# Patient Record
Sex: FEMALE | Race: OTHER | HISPANIC OR LATINO | URBAN - METROPOLITAN AREA
[De-identification: names, ages, dates, MRNs, and addresses within clinical notes are randomized per-mention and may not be internally consistent; named-entity substitution may affect disease eponyms.]

---

## 2021-12-09 ENCOUNTER — EMERGENCY (EMERGENCY)
Facility: HOSPITAL | Age: 21
LOS: 1 days | Discharge: LEFT BEFORE DISCHARGE | End: 2021-12-09
Payer: SELF-PAY

## 2021-12-09 VITALS
SYSTOLIC BLOOD PRESSURE: 126 MMHG | DIASTOLIC BLOOD PRESSURE: 75 MMHG | TEMPERATURE: 98 F | HEART RATE: 91 BPM | RESPIRATION RATE: 18 BRPM | OXYGEN SATURATION: 98 %

## 2021-12-09 PROCEDURE — T1013: CPT

## 2021-12-09 PROCEDURE — L9991: CPT

## 2021-12-09 NOTE — ED ADULT TRIAGE NOTE - CHIEF COMPLAINT QUOTE
Presents to ED stating that she 12 weeks pregnant and having abdominal pain that began today. Denies N/V/D. This is her first pregnancy. Denies vaginal bleeding. No other complaints at this time.

## 2021-12-09 NOTE — ED ADULT TRIAGE NOTE - PATIENT/CAREGIVER ACCEPTED INTERPRETER SERVICES
yes
Anal Abscess     Section  X 2  Cosmetic Surgery  liposuction abdomen 11/24/10  H/O breast surgery  Reconstructive surgery - Right & Left  S/P carpal tunnel release  Bilateral  S/P Cholecystectomy    S/P excision of vocal cord nodule  2015  S/P Gastric Bypass   Dr. Gibson  S/P Lumpectomy of Breast  left   S/P mastectomy, bilateral

## 2022-01-18 PROBLEM — Z00.00 ENCOUNTER FOR PREVENTIVE HEALTH EXAMINATION: Status: ACTIVE | Noted: 2022-01-18

## 2022-01-19 ENCOUNTER — APPOINTMENT (OUTPATIENT)
Dept: MATERNAL FETAL MEDICINE | Facility: CLINIC | Age: 22
End: 2022-01-19

## 2022-01-27 ENCOUNTER — APPOINTMENT (OUTPATIENT)
Dept: ANTEPARTUM | Facility: CLINIC | Age: 22
End: 2022-01-27

## 2022-01-27 ENCOUNTER — APPOINTMENT (OUTPATIENT)
Dept: MATERNAL FETAL MEDICINE | Facility: CLINIC | Age: 22
End: 2022-01-27

## 2022-02-07 ENCOUNTER — APPOINTMENT (OUTPATIENT)
Dept: MATERNAL FETAL MEDICINE | Facility: CLINIC | Age: 22
End: 2022-02-07
Payer: MEDICAID

## 2022-02-07 ENCOUNTER — APPOINTMENT (OUTPATIENT)
Dept: ANTEPARTUM | Facility: CLINIC | Age: 22
End: 2022-02-07
Payer: MEDICAID

## 2022-02-07 ENCOUNTER — ASOB RESULT (OUTPATIENT)
Age: 22
End: 2022-02-07

## 2022-02-07 VITALS
BODY MASS INDEX: 31.13 KG/M2 | HEART RATE: 93 BPM | HEIGHT: 65.5 IN | OXYGEN SATURATION: 98 % | RESPIRATION RATE: 16 BRPM | WEIGHT: 189.13 LBS

## 2022-02-07 DIAGNOSIS — Z83.3 FAMILY HISTORY OF DIABETES MELLITUS: ICD-10-CM

## 2022-02-07 PROCEDURE — 99204 OFFICE O/P NEW MOD 45 MIN: CPT

## 2022-02-07 PROCEDURE — 76811 OB US DETAILED SNGL FETUS: CPT

## 2022-02-07 PROCEDURE — 36415 COLL VENOUS BLD VENIPUNCTURE: CPT

## 2022-02-07 PROCEDURE — ZZZZZ: CPT

## 2022-02-07 RX ORDER — VITAMIN C, CALCIUM, IRON, VITAMIN D3, VITAMIN E, VITAMIN B1, VITAMIN B2, VITAMIN B3, VITAMIN B6, FOLIC ACID, IODINE, ZINC, COPPER, DOCUSATE SODIUM, DOCOSAHEXAENOIC ACID (DHA) 27-1-50 MG
KIT ORAL
Refills: 0 | Status: ACTIVE | COMMUNITY

## 2022-02-07 NOTE — VITALS
[LMP (date): ___] : LMP was on [unfilled] [GA =___ Weeks] : which calculates to a GA of [unfilled] weeks [UZMA by LMP (date): ___] : The calculated UZMA by LMP is [unfilled] [US Date: ___] : ultrasound performed on [unfilled]. [GA= ___ Weeks] : Results were GA of [unfilled] weeks [GA= ___ Days] : and [unfilled] day(s) [UZMA by US (date): ___] : The calculated UZMA by US is [unfilled] [By US] : this is the final UZMA

## 2022-02-07 NOTE — CHIEF COMPLAINT
[G ___] : G [unfilled] [P ___] : P [unfilled] [de-identified] : having gestational diabetes and being late for prenatal care

## 2022-02-07 NOTE — OB HISTORY
[Spontaneous] : Spontaneous conception [Definite:  ___ (Date)] : the last menstrual period was [unfilled] [Normal Amount/Duration] : was of a normal amount and duration [Regular Cycle Intervals] : periods have been regular [Sonogram] : sonogram [FreeTextEntry1] : First prenatal visit was November 17, 2021.\par \par A 1 hour Glucola challenge test done on November 17, 2021 was 206 which is consistent with a diagnosis of gestational diabetes.  Hemoglobin A1c level done on November 17, 2021 was 5.7%.\par  [Spotting Between  Menses] : no spotting between menses

## 2022-02-07 NOTE — FAMILY HISTORY
[Age 35+ During Pregnancy] : not 35 or over during pregnancy [Reported Family History Of Birth Defects] : no congenital heart defects [Drake-Sachs Carrier] : no Drake-Sachs [Family History] : no mental retardation/autism [Reported Family History Of Genetic Disease] : no history of child defect in child of baby father

## 2022-02-07 NOTE — ACTIVE PROBLEMS
[Hypertension] : no hypertension [Heart Disease] : no heart disease [Autoimmune Disease] : no autoimmune disease [Renal Disease] : no kidney disease, no UTI [Neurologic Disorder] : no neurologic disorder, no epilepsy Detail Level: Detailed [Psychiatric Disorders] : no psychiatric disorders [Depression] : no depression, no post partum depression [Hepatic Disorder] : no hepatitis, no liver disease [Thrombophlebitis] : no varicosities, no phlebitis [Thyroid Disorder] : no thyroid dysfunction [Trauma] : no trauma/violence [Blood Transfusion (___ Ml)] : no history of blood transfusion

## 2022-02-07 NOTE — DISCUSSION/SUMMARY
[FreeTextEntry1] : She is 28 weeks and 1 day gestation by today's ultrasound examination.  \par \par She told me that she was late for prenatal care because she did not have medical insurance. I told her that early prenatal care can identify risk factors and provide opportunities for early intervention to lower  birth rates and other pregnancy complications.\par \par She is overweight and obesity has been associated with a number of maternal complications such as gestational diabetes, pre-eclampsia, thrombophlebitis, labor abnormalities, post-term pregnancies,  delivery, and operative complications. Obesity has been associated with adverse fetal outcomes such as late stillbirth and  deliveries.  Obese women also have a two to three-fold increased incidence in congenital anomalies.  No major fetal malformations were seen during today's ultrasound examination.  She now has gestational diabetes.  \par \par Regarding her gestational diabetes, she has not been adherent to the diabetes management recommendations.  She was a no-show to a telehealth visit with our diabetes educator on 2022. She was also a no-show for an MFM visit on 2022.  She has not been doing the recommended 4 times daily self glucose monitoring.  She has been testing her glucose twice daily (before breakfast and 1 hour after lunch).  She states that a nurse at the [facility] provided dietary counseling on 2022.  She has not been keeping a food and glucose diary.  She did not bring her glucometer for my review.  At this time her glycemic control cannot be determined.  I informed her that maintaining euglycemia is the most important factor associated with good  outcomes in pregnancies complicated by gestational diabetes. I told her that poor glucose control may cause fetal macrosomia, shoulder dystocia,  delivery,  respiratory distress syndrome and  metabolic complications such as hypoglycemia and hyperbilirubinemia.  I told her that she is at risk for developing gestational hypertension or preeclampsia during the current pregnancy. I also told her that she is at risk for developing type 2 diabetes, metabolic syndrome and cardiovascular disease later in life.  I also recommend a 75 gram 2 hour OGTT approximately 6 - 8 weeks postpartum to determine whether she has impaired glucose tolerance or preexisting diabetes not diagnosed prior to the pregnancy. I gave her dietary counseling.  I told her to eat three daily meals with 3 snacks to reduce postprandial glucose fluctuations. I gave her a food/glucose diary to write her daily food intake. She was advised to bring the food/glucose diary to her prenatal visits. She has been scheduled to have a telehealth visit with our diabetes educator on 22 for initial diabetes education and counseling. I ordered a hemoglobin A1c level. She was scheduled to have a follow-up [facility] visit in 1 week.\par \par She has risk factors for developing preeclampsia with the current pregnancy.  I told her that taking a baby aspirin during pregnancy has been found to reduce the risk of having preeclampsia,  delivery, and growth restricted infants. She was advised to take one baby aspirin daily. I also told her to stop taking the baby aspirin once the pregnancy reaches 37 weeks gestation.

## 2022-02-09 ENCOUNTER — APPOINTMENT (OUTPATIENT)
Dept: MATERNAL FETAL MEDICINE | Facility: CLINIC | Age: 22
End: 2022-02-09
Payer: MEDICAID

## 2022-02-09 ENCOUNTER — ASOB RESULT (OUTPATIENT)
Age: 22
End: 2022-02-09

## 2022-02-09 DIAGNOSIS — R73.03 PREDIABETES.: ICD-10-CM

## 2022-02-09 LAB
ESTIMATED AVERAGE GLUCOSE: 111 MG/DL
HBA1C MFR BLD HPLC: 5.5 %

## 2022-02-09 PROCEDURE — G0108 DIAB MANAGE TRN  PER INDIV: CPT | Mod: 95

## 2022-02-14 ENCOUNTER — APPOINTMENT (OUTPATIENT)
Dept: MATERNAL FETAL MEDICINE | Facility: CLINIC | Age: 22
End: 2022-02-14
Payer: MEDICAID

## 2022-02-14 ENCOUNTER — APPOINTMENT (OUTPATIENT)
Dept: ANTEPARTUM | Facility: CLINIC | Age: 22
End: 2022-02-14

## 2022-02-14 VITALS
SYSTOLIC BLOOD PRESSURE: 114 MMHG | HEIGHT: 64 IN | DIASTOLIC BLOOD PRESSURE: 64 MMHG | WEIGHT: 191.38 LBS | BODY MASS INDEX: 32.67 KG/M2 | OXYGEN SATURATION: 98 % | HEART RATE: 90 BPM | RESPIRATION RATE: 16 BRPM

## 2022-02-14 VITALS
DIASTOLIC BLOOD PRESSURE: 64 MMHG | HEART RATE: 90 BPM | HEIGHT: 64 IN | WEIGHT: 191.38 LBS | SYSTOLIC BLOOD PRESSURE: 114 MMHG | OXYGEN SATURATION: 98 % | BODY MASS INDEX: 32.67 KG/M2 | RESPIRATION RATE: 16 BRPM

## 2022-02-14 DIAGNOSIS — E66.9 OBESITY, UNSPECIFIED: ICD-10-CM

## 2022-02-14 PROCEDURE — 99215 OFFICE O/P EST HI 40 MIN: CPT

## 2022-02-14 NOTE — DISCUSSION/SUMMARY
[FreeTextEntry1] : She is 29 weeks and 1 day gestation by late ultrasound dating. \par  \par Regarding her gestational diabetes, she has not been adherent to the diabetes management recommendations.  She she had a telehealth visit on 2022 with our diabetes educator for initial diabetes education and counseling.  She now states that she has been doing the recommended 4 times daily self glucose monitoring.  She did not bring her food/glucose diary for my review. She also did not bring her glucometer for my review. She states that her fasting glucose ranges between 80 -100, and the postprandial glucose readings range between 95 -119. The hemoglobin A1c level done on 2022 was 5.5% which corresponds to an estimated daily average glucose of 111 mg/dL.  At this time her glycemic control appears to be good based on the hemoglobin A1c level.  I again informed her that maintaining euglycemia is the most important factor associated with good  outcomes in pregnancies complicated by gestational diabetes. I told her that poor glucose control may cause fetal macrosomia, shoulder dystocia,  delivery,  respiratory distress syndrome and  metabolic complications such as hypoglycemia and hyperbilirubinemia.  I told her that she is at risk for developing gestational hypertension or preeclampsia during the current pregnancy.  I gave her dietary counseling.  I encouraged her to continue eating three daily meals with 3 snacks to reduce postprandial glucose fluctuations. I again gave her a food/glucose diary to write her daily food intake. She was encouraged to bring the food/glucose diary to her prenatal visits. She has been scheduled to have a telehealth visit with our diabetes educator on 3/7/22.  She was advised to have follow-up [facility] visit in 7 weeks.\par \par She has risk factors for developing preeclampsia with the current pregnancy.  She has not started taking the recommended daily baby aspirin.  I told her that her gestational age is too advanced at this time to start taking the daily baby aspirin medication.

## 2022-02-14 NOTE — ACTIVE PROBLEMS
[Heart Disease] : no heart disease [Hypertension] : no hypertension [Neurologic Disorder] : no neurologic disorder, no epilepsy [Renal Disease] : no kidney disease, no UTI [Autoimmune Disease] : no autoimmune disease [Depression] : no depression, no post partum depression [Hepatic Disorder] : no hepatitis, no liver disease [Psychiatric Disorders] : no psychiatric disorders [Trauma] : no trauma/violence [Thyroid Disorder] : no thyroid dysfunction [Thrombophlebitis] : no varicosities, no phlebitis [Blood Transfusion (___ Ml)] : no history of blood transfusion

## 2022-02-14 NOTE — OB HISTORY
[Spontaneous] : Spontaneous conception [Sonogram] : sonogram [Definite:  ___ (Date)] : the last menstrual period was [unfilled] [Normal Amount/Duration] : was of a normal amount and duration [Regular Cycle Intervals] : periods have been regular [at ___ wks] : at [unfilled] weeks [FreeTextEntry1] : First prenatal visit was November 17, 2021.\par \par A 1 hour Glucola challenge test done on November 17, 2021 was 206 which is consistent with a diagnosis of gestational diabetes.  Hemoglobin A1c level done on November 17, 2021 was 5.7%.\par \par She had a maternal-fetal medicine consultation on February 7, 2022 because of the gestational diabetes, obesity, and late/insufficient prenatal care.\par  [Spotting Between  Menses] : no spotting between menses

## 2022-02-14 NOTE — VITALS
[US Date: ___] : ultrasound performed on [unfilled]. [GA= ___ Weeks] : Results were GA of [unfilled] weeks [GA= ___ Days] : and [unfilled] day(s) [UZMA by US (date): ___] : The calculated UZMA by US is [unfilled] [By US] : this is the final UZMA

## 2022-02-14 NOTE — PAST MEDICAL HISTORY
[Chlamydial Infections] : no chlamydia [HIV Infection] : no HIV [Exposure To Gonorrhea] : no gonorrhea [Syphilis] : no syphilis [Herpes Simplex] : no genital herpes [Hepatitis, B Virus] : no Hepatitis B [Human Papilloma Virus Infection] : no genital warts [Trichomoniasis] : no trichomoniasis [Hepatitis, C Virus] : no Hepatitis C

## 2022-02-28 ENCOUNTER — APPOINTMENT (OUTPATIENT)
Dept: MATERNAL FETAL MEDICINE | Facility: CLINIC | Age: 22
End: 2022-02-28

## 2022-03-07 ENCOUNTER — APPOINTMENT (OUTPATIENT)
Dept: ANTEPARTUM | Facility: CLINIC | Age: 22
End: 2022-03-07

## 2022-03-09 ENCOUNTER — APPOINTMENT (OUTPATIENT)
Dept: ANTEPARTUM | Facility: CLINIC | Age: 22
End: 2022-03-09
Payer: MEDICAID

## 2022-03-09 ENCOUNTER — ASOB RESULT (OUTPATIENT)
Age: 22
End: 2022-03-09

## 2022-03-09 PROCEDURE — 76816 OB US FOLLOW-UP PER FETUS: CPT

## 2022-03-09 PROCEDURE — 76819 FETAL BIOPHYS PROFIL W/O NST: CPT

## 2022-03-17 ENCOUNTER — ASOB RESULT (OUTPATIENT)
Age: 22
End: 2022-03-17

## 2022-03-17 ENCOUNTER — APPOINTMENT (OUTPATIENT)
Dept: MATERNAL FETAL MEDICINE | Facility: CLINIC | Age: 22
End: 2022-03-17
Payer: MEDICAID

## 2022-03-17 PROCEDURE — G0108 DIAB MANAGE TRN  PER INDIV: CPT | Mod: 95

## 2022-03-24 ENCOUNTER — APPOINTMENT (OUTPATIENT)
Dept: MATERNAL FETAL MEDICINE | Facility: CLINIC | Age: 22
End: 2022-03-24
Payer: MEDICAID

## 2022-03-24 ENCOUNTER — ASOB RESULT (OUTPATIENT)
Age: 22
End: 2022-03-24

## 2022-03-24 VITALS — BODY MASS INDEX: 30.73 KG/M2 | HEIGHT: 64 IN | WEIGHT: 180 LBS

## 2022-03-24 PROCEDURE — G0108 DIAB MANAGE TRN  PER INDIV: CPT | Mod: 95

## 2022-03-29 ENCOUNTER — OUTPATIENT (OUTPATIENT)
Dept: OUTPATIENT SERVICES | Facility: HOSPITAL | Age: 22
LOS: 1 days | End: 2022-03-29
Payer: COMMERCIAL

## 2022-03-29 VITALS
HEART RATE: 90 BPM | SYSTOLIC BLOOD PRESSURE: 137 MMHG | TEMPERATURE: 98 F | DIASTOLIC BLOOD PRESSURE: 86 MMHG | RESPIRATION RATE: 18 BRPM

## 2022-03-29 VITALS — HEART RATE: 100 BPM | DIASTOLIC BLOOD PRESSURE: 80 MMHG | SYSTOLIC BLOOD PRESSURE: 121 MMHG

## 2022-03-29 DIAGNOSIS — O47.03 FALSE LABOR BEFORE 37 COMPLETED WEEKS OF GESTATION, THIRD TRIMESTER: ICD-10-CM

## 2022-03-29 PROCEDURE — G0463: CPT

## 2022-03-29 PROCEDURE — 59025 FETAL NON-STRESS TEST: CPT

## 2022-03-29 NOTE — OB RN TRIAGE NOTE - FALL HARM RISK - UNIVERSAL INTERVENTIONS
Bed in lowest position, wheels locked, appropriate side rails in place/Call bell, personal items and telephone in reach/Instruct patient to call for assistance before getting out of bed or chair/Non-slip footwear when patient is out of bed/Counselor to call system/Physically safe environment - no spills, clutter or unnecessary equipment/Purposeful Proactive Rounding/Room/bathroom lighting operational, light cord in reach

## 2022-03-29 NOTE — OB PROVIDER TRIAGE NOTE - HISTORY OF PRESENT ILLNESS
23yo  at 35w2d who presents to L&D for CTX since 12am. Denies LOF and vaginal bleeding. Patient has one episode of vomiting in triage however not current nauseous. Endorses good FM. Denies HA, changes in vision, CP, SOB.    UZMA: 22  LMP: 21    Pregnancy complicated by:  1. GDMA1  2. GBS bacteruria  3. obesity    ObHx: denies  GynHx: denies  PMH: pregestational DM  Meds: PNV  All: NKDA  SurgHx: denies  SocialHx: denies EtOH, tobacco and illicit drug use in pregnancy

## 2022-03-29 NOTE — OB PROVIDER TRIAGE NOTE - NSOBPROVIDERNOTE_OBGYN_ALL_OB_FT
23yo  at 35w2d who presents to L&D for rule out labor.  -VSS  -FHT Cat 1  -no CTX on toco  - 23yo  at 35w2d who presents to L&D for rule out labor.  -VSS  -FHT Cat 1  -no CTX on toco  -SVE: 0/0/-3    Patient has an appointment at [facility] clinic scheduled for . Discussed to return to L&D if CTX increase in strength and frequency, VB, LOF, and/or decreased FM.    Discussed with Dr. Coates

## 2022-03-29 NOTE — OB PROVIDER TRIAGE NOTE - NSHPPHYSICALEXAM_GEN_ALL_CORE
/80  RR 18  Temp 98.2 F    Gen: AAOx3  Abd: soft, gravid  Ext: no tenderness to calf palpation    SVE: 0/0/-3    FHT: baseline 130s, moderate variability, + acels, -decels  [place]: no CTX   /80  RR 18  Temp 98.2 F    Gen: AAOx3  Abd: soft, gravid  Ext: no tenderness to calf palpation    SVE: 0/0/-3    FHT: baseline 130s, moderate variability, + acels, -decels  [place]: no CTX, uterine irritability

## 2022-04-04 ENCOUNTER — APPOINTMENT (OUTPATIENT)
Dept: ANTEPARTUM | Facility: CLINIC | Age: 22
End: 2022-04-04
Payer: MEDICAID

## 2022-04-04 ENCOUNTER — APPOINTMENT (OUTPATIENT)
Dept: MATERNAL FETAL MEDICINE | Facility: CLINIC | Age: 22
End: 2022-04-04
Payer: MEDICAID

## 2022-04-04 ENCOUNTER — ASOB RESULT (OUTPATIENT)
Age: 22
End: 2022-04-04

## 2022-04-04 VITALS
HEART RATE: 96 BPM | SYSTOLIC BLOOD PRESSURE: 118 MMHG | OXYGEN SATURATION: 98 % | HEIGHT: 64 IN | RESPIRATION RATE: 18 BRPM | DIASTOLIC BLOOD PRESSURE: 70 MMHG

## 2022-04-04 VITALS
WEIGHT: 192 LBS | HEIGHT: 64 IN | OXYGEN SATURATION: 98 % | SYSTOLIC BLOOD PRESSURE: 118 MMHG | BODY MASS INDEX: 32.78 KG/M2 | HEART RATE: 96 BPM | DIASTOLIC BLOOD PRESSURE: 70 MMHG | RESPIRATION RATE: 18 BRPM

## 2022-04-04 VITALS — BODY MASS INDEX: 32.96 KG/M2 | WEIGHT: 192 LBS

## 2022-04-04 DIAGNOSIS — Z3A.36 36 WEEKS GESTATION OF PREGNANCY: ICD-10-CM

## 2022-04-04 DIAGNOSIS — Z91.19 PATIENT'S NONCOMPLIANCE WITH OTHER MEDICAL TREATMENT AND REGIMEN: ICD-10-CM

## 2022-04-04 DIAGNOSIS — Z3A.29 29 WEEKS GESTATION OF PREGNANCY: ICD-10-CM

## 2022-04-04 PROBLEM — Z78.9 OTHER SPECIFIED HEALTH STATUS: Chronic | Status: ACTIVE | Noted: 2022-03-29

## 2022-04-04 PROCEDURE — 76818 FETAL BIOPHYS PROFILE W/NST: CPT

## 2022-04-04 PROCEDURE — 99214 OFFICE O/P EST MOD 30 MIN: CPT

## 2022-04-04 PROCEDURE — 76816 OB US FOLLOW-UP PER FETUS: CPT

## 2022-04-04 PROCEDURE — 36415 COLL VENOUS BLD VENIPUNCTURE: CPT

## 2022-04-04 PROCEDURE — ZZZZZ: CPT

## 2022-04-04 PROCEDURE — 76820 UMBILICAL ARTERY ECHO: CPT

## 2022-04-04 NOTE — DISCUSSION/SUMMARY
[FreeTextEntry1] : She is 36 weeks and 1 day gestation by late ultrasound dating. \par  \par Regarding her gestational diabetes, she has not been adherent to the diabetes management recommendations.  She had a follow-up telehealth visit with our diabetes educator on 3/24/22.  Today she did not bring her current food / glucose diary for my review.  She did have an old food / glucose diary.  I noticed that she has been doing self glucose monitoring 7 x daily.  I explained that she needs to test her glucose only 4 x daily (before breakfast and 1 hour after breakfast, lunch, and dinner).  She also did not bring her glucometer for my review. She states that the fasting glucose readings range between 86 - 99, and the postprandial glucose readings range between 95 - 130. The hemoglobin A1c level done on February 7, 2022 was 5.5% which corresponds to an estimated daily average glucose of 111 mg/dL.  I gave her dietary counseling.  I encouraged her to eat three daily meals with 3 snacks to reduce postprandial glucose fluctuations. She was encouraged to bring the food / glucose diary to her prenatal visits.  I encouraged her to test her glucose 4 times daily.  She has been scheduled to have weekly fetal testing until delivery.  I ordered a hemoglobin A1c level.  She has been scheduled to have a follow-up telehealth visit with our diabetes educator on 4/18//22.  She was advised to have a follow-up MFM visit in 1 week.\par \par An ultrasound done today reported an estimated fetal weight of 6 pounds 4 ounces or 2834 g which was at the 49th percentile for the gestational age.  The fetal presentation was vertex.  The amniotic fluid volume was normal.  A biophysical profile score was 10/10.\par \par

## 2022-04-04 NOTE — OB HISTORY
[Spontaneous] : Spontaneous conception [Sonogram] : sonogram [at ___ wks] : at [unfilled] weeks [Definite:  ___ (Date)] : the last menstrual period was [unfilled] [Normal Amount/Duration] : was of a normal amount and duration [Regular Cycle Intervals] : periods have been regular [FreeTextEntry1] : First prenatal visit was November 17, 2021.\par \par A 1 hour Glucola challenge test done on November 17, 2021 was 206 which is consistent with a diagnosis of gestational diabetes.  Hemoglobin A1c level done on November 17, 2021 was 5.7%.\par \par She had a maternal-fetal medicine consultation on February 7, 2022 because of the gestational diabetes, obesity, and late/insufficient prenatal care. She had a follow-up MFM visit on February 14, 2022 for the gestational diabetes. [Spotting Between  Menses] : no spotting between menses

## 2022-04-05 LAB
ESTIMATED AVERAGE GLUCOSE: 126 MG/DL
HBA1C MFR BLD HPLC: 6 %

## 2022-04-11 ENCOUNTER — APPOINTMENT (OUTPATIENT)
Dept: ANTEPARTUM | Facility: CLINIC | Age: 22
End: 2022-04-11
Payer: MEDICAID

## 2022-04-11 ENCOUNTER — ASOB RESULT (OUTPATIENT)
Age: 22
End: 2022-04-11

## 2022-04-11 ENCOUNTER — APPOINTMENT (OUTPATIENT)
Dept: MATERNAL FETAL MEDICINE | Facility: CLINIC | Age: 22
End: 2022-04-11
Payer: MEDICAID

## 2022-04-11 DIAGNOSIS — O99.213 OBESITY COMPLICATING PREGNANCY, THIRD TRIMESTER: ICD-10-CM

## 2022-04-11 DIAGNOSIS — O09.30 SUPERVISION OF PREGNANCY WITH INSUFFICIENT ANTENATAL CARE, UNSPECIFIED TRIMESTER: ICD-10-CM

## 2022-04-11 DIAGNOSIS — O24.410 GESTATIONAL DIABETES MELLITUS IN PREGNANCY, DIET CONTROLLED: ICD-10-CM

## 2022-04-11 PROCEDURE — ZZZZZ: CPT

## 2022-04-11 PROCEDURE — 99215 OFFICE O/P EST HI 40 MIN: CPT | Mod: TH

## 2022-04-11 PROCEDURE — 76818 FETAL BIOPHYS PROFILE W/NST: CPT

## 2022-04-11 PROCEDURE — 76820 UMBILICAL ARTERY ECHO: CPT

## 2022-04-14 ENCOUNTER — APPOINTMENT (OUTPATIENT)
Dept: MATERNAL FETAL MEDICINE | Facility: CLINIC | Age: 22
End: 2022-04-14

## 2022-04-14 PROBLEM — O24.410 DIET CONTROLLED GESTATIONAL DIABETES MELLITUS (GDM) IN THIRD TRIMESTER: Status: ACTIVE | Noted: 2022-02-07

## 2022-04-14 PROBLEM — O09.30 LATE PRENATAL CARE AFFECTING PREGNANCY, ANTEPARTUM: Status: ACTIVE | Noted: 2022-02-07

## 2022-04-14 PROBLEM — O99.213 OBESITY AFFECTING PREGNANCY IN THIRD TRIMESTER: Status: ACTIVE | Noted: 2022-02-07

## 2022-04-14 NOTE — VITALS
[LMP (date): ___] : LMP was on [unfilled] [UZMA by LMP (date): ___] : The calculated UZMA by LMP is [unfilled] [By LMP] : this is the final ZUMA [GA =___ Weeks] : which calculates to a GA of [unfilled] weeks [GA= ___ Days] : and [unfilled] day(s)

## 2022-04-14 NOTE — DATA REVIEWED
[FreeTextEntry1] : TAUS 4/4/22 - vertex, posterior placenta, EFW 2834 gm (49%), AC 75%, CARMELLA 15.7 gm, BPP 8/8, normal UA Doppler\par \par TAUS 4/11/22 - vertex, posterior placenta, CARMELLA 10.83 cm, BPP 8/8, normal UA Doppler studies\par \par BG log 4/4-4/11\par Fasting 80-96 (1 elevated of 8 recorded values)\par 1 hour post prandial 81-99\par \par HgA1c 4/4/22 - 6.0%

## 2022-04-14 NOTE — HISTORY OF PRESENT ILLNESS
[FreeTextEntry1] : Charity Alvarez is a 22 y.o.  with LMP of 21 and EDC of 22 who presents at 37w1d for follow up  consultation secondary to pregnancy complicated by A1GDM.  Her BMI is 33 kg/m2.  At the time of visit last week, patient was checking BG values multiple times per day and did not bring her BG log in for review.  Since that visit, Charity reports she has been more consistent with logging BG values.  She feels well and reports no constitutional or obstetrical complaint.  Charity confirms fetal movement.

## 2022-04-14 NOTE — OB HISTORY
[LMP: ___] : LMP: [unfilled] [UZMA: ___] : UZMA: [unfilled] [Spontaneous] : Spontaneous conception [Definite:  ___ (Date)] : the last menstrual period was [unfilled] [Normal Amount/Duration] : was of a normal amount and duration [Regular Cycle Intervals] : periods have been regular [EGA: ___ wks] : EGA: [unfilled] wks [FreeTextEntry1] : Initial MFM consultation 2/7/22 with last follow up 4/4/22\par Last follow up with diabetic education 3/24/22\par Patient now checking fasting and 1 hour post prandial BG values\par  [Spotting Between  Menses] : no spotting between menses

## 2022-04-14 NOTE — SURGICAL HISTORY
[Last Pap: ___] : Last Pap: [unfilled] [Fibroids] : no fibroids [Abn Paps] : no abnormal pap smears [Breast Disease] : no breast disease [Infertility] : no infertility [STI's] : no STI's [Cysts] : no cysts [OC Use] : no OC use [Last Mammo: ___] : Last Mammo: none

## 2022-04-14 NOTE — DISCUSSION/SUMMARY
[FreeTextEntry1] : At the time of follow up consultation, we reviewed the various maternal, fetal and  complications of gestational diabetes, including fetal growth abnormality, polyhydramnios,  labor/delivery, fetal distress, unexplained stillbirth, labor complications including shoulder dystocia and increased risk of  section and preeclampsia.  We also discussed the increased rate of NICU admission, delayed lung maturity,  hypoglycemia and other metabolic disturbances.  All of these complications are increased in the setting of suboptimal glucose control. She is aware that optimal glycemic control can help minimize these risks, but cannot eliminate them completely.\par \par We reviewed her BG log, and the majority of values are within target range; however, HgA1c sent at the time of last visit was elevated and appears inconsistent with her recorded log.  We reviewed dietary recommendations and the importance of maintaining a complete and accurate log. We reviewed target BG values in pregnancy, with the goal to maintain fasting blood sugars less than 90 and one hour postprandial values less than 140 with no value less than 60 and no value greater than 200. Recent ultrasound showed an AGA fetus with normal amniotic fluid volume and BPP.  Recommendations for fetal testing include serial assessment of fetal growth and weekly  testing until delivery.  Delivery is recommended at 39-39 6/7 weeks given current glycemic control. Finally, we discussed the importance of repeat GTT 6-12 weeks after delivery in order to test for persistent glucose intolerance outside of pregnancy, especially in light of her recent elevated HgA1c.  The patient voiced understanding of the above recommendations and counseling.  Charity has a follow up appointment with diabetic education on 22 and will follow up with your office as scheduled. Weekly fetal testing at our office has been scheduled.

## 2022-04-18 ENCOUNTER — APPOINTMENT (OUTPATIENT)
Dept: ANTEPARTUM | Facility: CLINIC | Age: 22
End: 2022-04-18
Payer: MEDICAID

## 2022-04-18 ENCOUNTER — ASOB RESULT (OUTPATIENT)
Age: 22
End: 2022-04-18

## 2022-04-18 ENCOUNTER — APPOINTMENT (OUTPATIENT)
Dept: MATERNAL FETAL MEDICINE | Facility: CLINIC | Age: 22
End: 2022-04-18
Payer: MEDICAID

## 2022-04-18 VITALS — HEIGHT: 64 IN | BODY MASS INDEX: 34.49 KG/M2 | WEIGHT: 202 LBS

## 2022-04-18 PROCEDURE — ZZZZZ: CPT

## 2022-04-18 PROCEDURE — 76820 UMBILICAL ARTERY ECHO: CPT

## 2022-04-18 PROCEDURE — 76818 FETAL BIOPHYS PROFILE W/NST: CPT

## 2022-04-18 PROCEDURE — G0108 DIAB MANAGE TRN  PER INDIV: CPT | Mod: 95

## 2022-04-20 ENCOUNTER — APPOINTMENT (OUTPATIENT)
Dept: ANTEPARTUM | Facility: CLINIC | Age: 22
End: 2022-04-20
Payer: MEDICAID

## 2022-04-20 PROCEDURE — 59025 FETAL NON-STRESS TEST: CPT

## 2022-04-23 RX ORDER — SODIUM CHLORIDE 9 MG/ML
1000 INJECTION, SOLUTION INTRAVENOUS
Refills: 0 | Status: DISCONTINUED | OUTPATIENT
Start: 2022-04-25 | End: 2022-04-27

## 2022-04-23 RX ORDER — OXYTOCIN 10 UNIT/ML
333.33 VIAL (ML) INJECTION
Qty: 20 | Refills: 0 | Status: DISCONTINUED | OUTPATIENT
Start: 2022-04-25 | End: 2022-04-27

## 2022-04-23 RX ORDER — AMPICILLIN TRIHYDRATE 250 MG
1 CAPSULE ORAL EVERY 4 HOURS
Refills: 0 | Status: DISCONTINUED | OUTPATIENT
Start: 2022-04-25 | End: 2022-04-26

## 2022-04-23 RX ORDER — CITRIC ACID/SODIUM CITRATE 300-500 MG
30 SOLUTION, ORAL ORAL ONCE
Refills: 0 | Status: COMPLETED | OUTPATIENT
Start: 2022-04-25 | End: 2022-04-25

## 2022-04-23 RX ORDER — SODIUM CHLORIDE 9 MG/ML
1000 INJECTION, SOLUTION INTRAVENOUS
Refills: 0 | Status: DISCONTINUED | OUTPATIENT
Start: 2022-04-25 | End: 2022-04-26

## 2022-04-23 NOTE — OB PROVIDER H&P - NSHPPHYSICALEXAM_GEN_ALL_CORE
Vital:    Gen:  Resp:  Abd:  VE:    Bedside sono:  FHT:  [place]: Vital Signs Last 24 Hrs  T(C): 37.2 (25 Apr 2022 10:24), Max: 37.2 (25 Apr 2022 10:24)  T(F): 98.96 (25 Apr 2022 10:24), Max: 98.96 (25 Apr 2022 10:24)  HR: 89 (25 Apr 2022 10:25) (89 - 89)  BP: 129/82 (25 Apr 2022 10:25) (129/82 - 129/82)  RR: 18 (25 Apr 2022 10:24) (18 - 18)    General: NAD, well-appearing  Heart: RRR  Lungs: CTAB  Abdominal: soft, gravid   Ext: non-tender, non-edematous   Bedside sono: cephalic   FHT: baseline 130s, moderate variability, +accels, -decels   [place]: manda occasionally

## 2022-04-23 NOTE — OB PROVIDER H&P - HISTORY OF PRESENT ILLNESS
Patient is a 22 year old  at 39w1d GA who presents to L&D for IOL 2/2 GDMA1.          UZMA: 22 by 3rd trimester US     LMP: 21           Pregnancy complicated by:     1. GDMA1     2. GBS bacteriuria     3. obesity           ObHx: denies     GynHx: denies     PMH: pregestational DM     Meds: PNV     All: NKDA     SurgHx: denies     SocialHx: denies EtOH, tobacco and illicit drug use in pregnancy          BMI: 31.95     Ultrasound: cephalic, post placenta     EFW: 2834g 22  Patient is a 22 year old  at 39w1d GA who presents to L&D for IOL 2/2 GDMA1. Patient denies vaginal bleeding, contractions and leakage of fluid. She endorses good fetal movement. Denies fevers, chills, nausea and vomiting. No other complaints at this time.   UZMA: 22 by 3rd trimester US   LMP: 21   Pregnancy complicated by:   1. GDMA1   2. GBS bacteriuria   3. Obesity        POB: denies   PGYN: -fibroids, -cysts, denies STD hx, denies abnormal PAP smears   PMH: pregestational DM   PSH: denies  SH: Denies EtOH, tobacco and illicit drug use during this pregnancy; Feels safe at home   Meds: PNVs   All: NKDA     BMI: 31.95   Ultrasound: cephalic, post placenta   EFW: 2834g 22

## 2022-04-23 NOTE — OB PROVIDER H&P - ASSESSMENT
Patient is a 22 year old  at 39w1d GA who presents to L&D for IOL 2/2 GDMA1.  A/P: Patient is a 22 year old  at 39w1d GA who is admitted to L&D for IOL 2/2 GDMA1.   -Admit to L&D  -Consent  -Admission labs  -NPO, except ice chips   -IV fluids  -Labor: Intact. Vaibhav occasionally. Will start induction of labor with pitocin.    -Fetus: Cat I tracing. Continuous toco and fetal monitoring.   -GBS: Positive, GBS ppx required   -Analgesia: Undecided   -DVT ppx: Ambulate and SCD's while in bed   -Female fetus     Discussed with Dr. Coates.

## 2022-04-25 ENCOUNTER — APPOINTMENT (OUTPATIENT)
Dept: ANTEPARTUM | Facility: CLINIC | Age: 22
End: 2022-04-25

## 2022-04-25 ENCOUNTER — INPATIENT (INPATIENT)
Facility: HOSPITAL | Age: 22
LOS: 1 days | Discharge: HOME | End: 2022-04-27
Attending: OBSTETRICS & GYNECOLOGY | Admitting: OBSTETRICS & GYNECOLOGY
Payer: COMMERCIAL

## 2022-04-25 ENCOUNTER — RESULT REVIEW (OUTPATIENT)
Age: 22
End: 2022-04-25

## 2022-04-25 ENCOUNTER — TRANSCRIPTION ENCOUNTER (OUTPATIENT)
Age: 22
End: 2022-04-25

## 2022-04-25 VITALS — TEMPERATURE: 99 F | RESPIRATION RATE: 18 BRPM

## 2022-04-25 LAB
ALBUMIN SERPL ELPH-MCNC: 3.4 G/DL — SIGNIFICANT CHANGE UP (ref 3.3–5.2)
ALP SERPL-CCNC: 195 U/L — HIGH (ref 40–120)
ALT FLD-CCNC: 179 U/L — HIGH
ANION GAP SERPL CALC-SCNC: 13 MMOL/L — SIGNIFICANT CHANGE UP (ref 5–17)
AST SERPL-CCNC: 72 U/L — HIGH
BASOPHILS # BLD AUTO: 0.04 K/UL — SIGNIFICANT CHANGE UP (ref 0–0.2)
BASOPHILS NFR BLD AUTO: 0.4 % — SIGNIFICANT CHANGE UP (ref 0–2)
BILIRUB SERPL-MCNC: 0.3 MG/DL — LOW (ref 0.4–2)
BLD GP AB SCN SERPL QL: SIGNIFICANT CHANGE UP
BUN SERPL-MCNC: 6.6 MG/DL — LOW (ref 8–20)
CALCIUM SERPL-MCNC: 9.2 MG/DL — SIGNIFICANT CHANGE UP (ref 8.6–10.2)
CHLORIDE SERPL-SCNC: 105 MMOL/L — SIGNIFICANT CHANGE UP (ref 98–107)
CO2 SERPL-SCNC: 20 MMOL/L — LOW (ref 22–29)
CREAT SERPL-MCNC: 0.41 MG/DL — LOW (ref 0.5–1.3)
EGFR: 143 ML/MIN/1.73M2 — SIGNIFICANT CHANGE UP
EOSINOPHIL # BLD AUTO: 0.1 K/UL — SIGNIFICANT CHANGE UP (ref 0–0.5)
EOSINOPHIL NFR BLD AUTO: 1 % — SIGNIFICANT CHANGE UP (ref 0–6)
GLUCOSE SERPL-MCNC: 105 MG/DL — HIGH (ref 70–99)
HCT VFR BLD CALC: 36.4 % — SIGNIFICANT CHANGE UP (ref 34.5–45)
HGB BLD-MCNC: 12.5 G/DL — SIGNIFICANT CHANGE UP (ref 11.5–15.5)
IMM GRANULOCYTES NFR BLD AUTO: 0.5 % — SIGNIFICANT CHANGE UP (ref 0–1.5)
LYMPHOCYTES # BLD AUTO: 1.79 K/UL — SIGNIFICANT CHANGE UP (ref 1–3.3)
LYMPHOCYTES # BLD AUTO: 18.2 % — SIGNIFICANT CHANGE UP (ref 13–44)
MCHC RBC-ENTMCNC: 30.4 PG — SIGNIFICANT CHANGE UP (ref 27–34)
MCHC RBC-ENTMCNC: 34.3 GM/DL — SIGNIFICANT CHANGE UP (ref 32–36)
MCV RBC AUTO: 88.6 FL — SIGNIFICANT CHANGE UP (ref 80–100)
MONOCYTES # BLD AUTO: 0.62 K/UL — SIGNIFICANT CHANGE UP (ref 0–0.9)
MONOCYTES NFR BLD AUTO: 6.3 % — SIGNIFICANT CHANGE UP (ref 2–14)
NEUTROPHILS # BLD AUTO: 7.23 K/UL — SIGNIFICANT CHANGE UP (ref 1.8–7.4)
NEUTROPHILS NFR BLD AUTO: 73.6 % — SIGNIFICANT CHANGE UP (ref 43–77)
PLATELET # BLD AUTO: 219 K/UL — SIGNIFICANT CHANGE UP (ref 150–400)
POTASSIUM SERPL-MCNC: 4 MMOL/L — SIGNIFICANT CHANGE UP (ref 3.5–5.3)
POTASSIUM SERPL-SCNC: 4 MMOL/L — SIGNIFICANT CHANGE UP (ref 3.5–5.3)
PROT SERPL-MCNC: 6.6 G/DL — SIGNIFICANT CHANGE UP (ref 6.6–8.7)
RBC # BLD: 4.11 M/UL — SIGNIFICANT CHANGE UP (ref 3.8–5.2)
RBC # FLD: 12.3 % — SIGNIFICANT CHANGE UP (ref 10.3–14.5)
SARS-COV-2 RNA SPEC QL NAA+PROBE: SIGNIFICANT CHANGE UP
SODIUM SERPL-SCNC: 138 MMOL/L — SIGNIFICANT CHANGE UP (ref 135–145)
WBC # BLD: 9.83 K/UL — SIGNIFICANT CHANGE UP (ref 3.8–10.5)
WBC # FLD AUTO: 9.83 K/UL — SIGNIFICANT CHANGE UP (ref 3.8–10.5)

## 2022-04-25 PROCEDURE — 88307 TISSUE EXAM BY PATHOLOGIST: CPT | Mod: 26

## 2022-04-25 RX ORDER — MAGNESIUM HYDROXIDE 400 MG/1
30 TABLET, CHEWABLE ORAL
Refills: 0 | Status: DISCONTINUED | OUTPATIENT
Start: 2022-04-25 | End: 2022-04-27

## 2022-04-25 RX ORDER — HYDROCORTISONE 1 %
1 OINTMENT (GRAM) TOPICAL EVERY 6 HOURS
Refills: 0 | Status: DISCONTINUED | OUTPATIENT
Start: 2022-04-25 | End: 2022-04-27

## 2022-04-25 RX ORDER — IBUPROFEN 200 MG
600 TABLET ORAL EVERY 6 HOURS
Refills: 0 | Status: COMPLETED | OUTPATIENT
Start: 2022-04-25 | End: 2023-03-24

## 2022-04-25 RX ORDER — OXYTOCIN 10 UNIT/ML
333.33 VIAL (ML) INJECTION
Qty: 20 | Refills: 0 | Status: DISCONTINUED | OUTPATIENT
Start: 2022-04-25 | End: 2022-04-27

## 2022-04-25 RX ORDER — AMPICILLIN TRIHYDRATE 250 MG
2 CAPSULE ORAL ONCE
Refills: 0 | Status: COMPLETED | OUTPATIENT
Start: 2022-04-25 | End: 2022-04-25

## 2022-04-25 RX ORDER — OXYCODONE HYDROCHLORIDE 5 MG/1
5 TABLET ORAL ONCE
Refills: 0 | Status: DISCONTINUED | OUTPATIENT
Start: 2022-04-25 | End: 2022-04-27

## 2022-04-25 RX ORDER — AER TRAVELER 0.5 G/1
1 SOLUTION RECTAL; TOPICAL EVERY 4 HOURS
Refills: 0 | Status: DISCONTINUED | OUTPATIENT
Start: 2022-04-25 | End: 2022-04-27

## 2022-04-25 RX ORDER — BENZOCAINE 10 %
1 GEL (GRAM) MUCOUS MEMBRANE EVERY 6 HOURS
Refills: 0 | Status: DISCONTINUED | OUTPATIENT
Start: 2022-04-25 | End: 2022-04-27

## 2022-04-25 RX ORDER — OXYCODONE HYDROCHLORIDE 5 MG/1
5 TABLET ORAL
Refills: 0 | Status: DISCONTINUED | OUTPATIENT
Start: 2022-04-25 | End: 2022-04-27

## 2022-04-25 RX ORDER — KETOROLAC TROMETHAMINE 30 MG/ML
30 SYRINGE (ML) INJECTION ONCE
Refills: 0 | Status: DISCONTINUED | OUTPATIENT
Start: 2022-04-25 | End: 2022-04-25

## 2022-04-25 RX ORDER — OXYTOCIN 10 UNIT/ML
2 VIAL (ML) INJECTION
Qty: 30 | Refills: 0 | Status: DISCONTINUED | OUTPATIENT
Start: 2022-04-25 | End: 2022-04-27

## 2022-04-25 RX ORDER — SIMETHICONE 80 MG/1
80 TABLET, CHEWABLE ORAL EVERY 4 HOURS
Refills: 0 | Status: DISCONTINUED | OUTPATIENT
Start: 2022-04-25 | End: 2022-04-27

## 2022-04-25 RX ORDER — PRAMOXINE HYDROCHLORIDE 150 MG/15G
1 AEROSOL, FOAM RECTAL EVERY 4 HOURS
Refills: 0 | Status: DISCONTINUED | OUTPATIENT
Start: 2022-04-25 | End: 2022-04-27

## 2022-04-25 RX ORDER — DIBUCAINE 1 %
1 OINTMENT (GRAM) RECTAL EVERY 6 HOURS
Refills: 0 | Status: DISCONTINUED | OUTPATIENT
Start: 2022-04-25 | End: 2022-04-27

## 2022-04-25 RX ORDER — ACETAMINOPHEN 500 MG
975 TABLET ORAL
Refills: 0 | Status: DISCONTINUED | OUTPATIENT
Start: 2022-04-25 | End: 2022-04-26

## 2022-04-25 RX ORDER — SODIUM CHLORIDE 9 MG/ML
3 INJECTION INTRAMUSCULAR; INTRAVENOUS; SUBCUTANEOUS EVERY 8 HOURS
Refills: 0 | Status: DISCONTINUED | OUTPATIENT
Start: 2022-04-25 | End: 2022-04-27

## 2022-04-25 RX ORDER — LANOLIN
1 OINTMENT (GRAM) TOPICAL EVERY 6 HOURS
Refills: 0 | Status: DISCONTINUED | OUTPATIENT
Start: 2022-04-25 | End: 2022-04-27

## 2022-04-25 RX ORDER — DIPHENHYDRAMINE HCL 50 MG
25 CAPSULE ORAL EVERY 6 HOURS
Refills: 0 | Status: DISCONTINUED | OUTPATIENT
Start: 2022-04-25 | End: 2022-04-27

## 2022-04-25 RX ORDER — TETANUS TOXOID, REDUCED DIPHTHERIA TOXOID AND ACELLULAR PERTUSSIS VACCINE, ADSORBED 5; 2.5; 8; 8; 2.5 [IU]/.5ML; [IU]/.5ML; UG/.5ML; UG/.5ML; UG/.5ML
0.5 SUSPENSION INTRAMUSCULAR ONCE
Refills: 0 | Status: DISCONTINUED | OUTPATIENT
Start: 2022-04-25 | End: 2022-04-27

## 2022-04-25 RX ADMIN — Medication 30 MILLILITER(S): at 18:45

## 2022-04-25 RX ADMIN — Medication 200 GRAM(S): at 11:48

## 2022-04-25 RX ADMIN — SODIUM CHLORIDE 125 MILLILITER(S): 9 INJECTION, SOLUTION INTRAVENOUS at 19:00

## 2022-04-25 RX ADMIN — Medication 108 GRAM(S): at 14:55

## 2022-04-25 RX ADMIN — Medication 108 GRAM(S): at 18:38

## 2022-04-25 RX ADMIN — SODIUM CHLORIDE 125 MILLILITER(S): 9 INJECTION, SOLUTION INTRAVENOUS at 14:55

## 2022-04-25 RX ADMIN — Medication 2 MILLIUNIT(S)/MIN: at 11:52

## 2022-04-25 RX ADMIN — Medication 108 GRAM(S): at 22:42

## 2022-04-25 RX ADMIN — SODIUM CHLORIDE 125 MILLILITER(S): 9 INJECTION, SOLUTION INTRAVENOUS at 11:10

## 2022-04-25 NOTE — OB RN DELIVERY SUMMARY - NSSELHIDDEN_OBGYN_ALL_OB_FT
[[email or web address]_OB_FT:[email or web address]],[NS_[email or web address]_ALL_OB_FT:[id number]],[NS_DeliveryRN_OBGYN_ALL_OB_FT:[id number]=]

## 2022-04-25 NOTE — OB RN PATIENT PROFILE - NS_OBGYNHISTORY_OBGYN_ALL_OB_FT
Pt comes from home by EMS for shortness of breath. Per family at bedside, pt was short of breath all day and it got worse in the last two hours prior to arrival. Pt has a hx of lung and esophageal cancer with mets to the brain. Pt arrives with fentanyl patch to left chest. Pt arrives on bipap by ems, transitioned to high flow nasal cannula on arrival to ED.
see above

## 2022-04-25 NOTE — OB PROVIDER DELIVERY SUMMARY - NSSELHIDDEN_OBGYN_ALL_OB_FT
[NS_[email or web address]_ALL_OB_FT:[id number]],[NS_DeliveryAssist1_OBGYN_ALL_OB_FT:[id number]=],[NS_DeliveryRN_OBGYN_ALL_OB_FT:[id number]=]

## 2022-04-25 NOTE — OB PROVIDER LABOR PROGRESS NOTE - ASSESSMENT
A/P  c/w current management  reposition to high fowlers When walking pt back to her room pt was asked to place her mask over her nose. Pt became aggressive and states that she is claustrophobic and can't do that. Pt informed that she is having covid symptoms and she needs to put her mask over her nose to avoid spreading infection to staff members. Pt states \"that's just a risk you're going to have to take\" told pt that is not how this works. Told patient that I will be back to triage her when she puts her mask over her nose. This writer walked out and shut her door pt states \"don't shut the door\" explained that the door has to be shut due to the negative pressure. Pt continues to yell and be argumentative. Alvin James RN witnessed and attempted to also explain to patient that she needs to put her mask on. Dr. Vazquez Persons in to eval pt asked to put her mask over her nose. Pt continues to yell and become aggressive. Security called.       Javier Burroughs RN  02/10/21 [id number] A/P  c/w current management  reposition to high fowlers    attending addendum  agree with above  IOL for GDMA1 now in labor. GBS pos on AMP  maternal/fetal status reassuring  continue current mgmt  ppalos

## 2022-04-25 NOTE — OB PROVIDER IHI INDUCTION/AUGMENTATION NOTE - NS_IHIPITOCINATTEND_OBGYN_ALL_OB_FT
BENEFITS:    Insurance: [employer]  Phone: [phone]  Contact Name: Sharyle Sheen Effective Date: 1/1/22     Plan year: CAL YEAR  Deductible: N/A      Deductible Met: N/A  Allowed/benefits paid at: 100% AFTER $40 COPAY  OOP: N/A  Freq Limits: MEDICAL NECESSITY  Prior Auth Requirement: NO AUTH REQUIRED    Notes:     Call Reference #: [id number]    Time of call:
ORDER PLACED:    Date: 2/15/22  Description: BILAT L2,3,4,5 RFA  Order Number: [id number]  Ordering Provider: [facility]  Performing Provider: [facility]  CPT Codes: 24564,16812  ICD10 Codes: K83.262
Patient scheduled procedure
RIGHT THEN LEFT L2,3,4,5 RFA    NO AUTH REQUIRED    OK to schedule procedure approved as above. Please note sides/levels approved and date range.    (If applicable, sides/levels approved may differ from those ordered)     [address]
Dr. Coates

## 2022-04-25 NOTE — OB PROVIDER LABOR PROGRESS NOTE - NS_SUBJECTIVE/OBJECTIVE_OBGYN_ALL_OB_FT
Patient seen and examined at bedside. She is doing well. No complaints at this time.
Pt doing well.
Pt doing well.

## 2022-04-25 NOTE — OB RN DELIVERY SUMMARY - NS_SEPSISRSKCALC_OBGYN_ALL_OB_FT
EOS calculated successfully. EOS Risk Factor: 0.5/1000 live births ([facility] national incidence); GA=39w1d; Temp=99; ROM=3.717; GBS='Positive'; Antibiotics='GBS specific antibiotics > 2 hrs prior to birth'

## 2022-04-25 NOTE — OB PROVIDER DELIVERY SUMMARY - NSPROVIDERDELIVERYNOTE_OBGYN_ALL_OB_FT
22y  presenting for IOL 2/2 GDMA1.  Patient felt rectal pressure and was found to be fully dilated, 0 station. She pushed effectively for 90 minutes, and delivered a viable female infant at 2310. Vertex delivered without difficulty, no nuchal cord noted, shoulders then delivered without difficulty. Placenta delivered spontaneously and intact at 2314. Pitocin started. Excellent hemostasis was achieved. Uterus, cervix, perineum and vagina were inspected and 1st degree laceration and periurethral laceration was noted and repaired with 2-0 and 3-0 chromic. Apgars 9/9, EBL 342cc. 22y  presenting for IOL 2/2 GDMA1.  Patient felt rectal pressure and was found to be fully dilated, 0 station. She pushed effectively for 90 minutes, and delivered a viable female infant at 2310. Vertex delivered without difficulty with Ritgen's maneuver, no nuchal cord noted, shoulders then delivered without difficulty. Placenta delivered spontaneously and intact at 2314. Pitocin started. Excellent hemostasis was achieved. Uterus, cervix, perineum and vagina were inspected and 1st degree laceration and periurethral laceration was noted and repaired with 2-0 and 3-0 chromic. Apgars 9/9, EBL 342cc.    agree with above  I was present throughout entire delivery   ppalos

## 2022-04-25 NOTE — OB RN PATIENT PROFILE - FALL HARM RISK - UNIVERSAL INTERVENTIONS
Bed in lowest position, wheels locked, appropriate side rails in place/Call bell, personal items and telephone in reach/Instruct patient to call for assistance before getting out of bed or chair/Non-slip footwear when patient is out of bed/[place] to call system/Physically safe environment - no spills, clutter or unnecessary equipment/Purposeful Proactive Rounding/Room/bathroom lighting operational, light cord in reach

## 2022-04-25 NOTE — OB PROVIDER LABOR PROGRESS NOTE - ASSESSMENT
A/P  c/w current management A/P  c/w current management    Attending addendum  IOL for GDMA1, now in labor and SROM  maternal/fetal status reassuring  continue current mgmt  ppalos

## 2022-04-26 LAB
ALBUMIN SERPL ELPH-MCNC: 2.7 G/DL — LOW (ref 3.3–5.2)
ALP SERPL-CCNC: 152 U/L — HIGH (ref 40–120)
ALT FLD-CCNC: 115 U/L — HIGH
ANION GAP SERPL CALC-SCNC: 10 MMOL/L — SIGNIFICANT CHANGE UP (ref 5–17)
AST SERPL-CCNC: 47 U/L — HIGH
BASOPHILS # BLD AUTO: 0.03 K/UL — SIGNIFICANT CHANGE UP (ref 0–0.2)
BASOPHILS NFR BLD AUTO: 0.2 % — SIGNIFICANT CHANGE UP (ref 0–2)
BILIRUB SERPL-MCNC: 0.5 MG/DL — SIGNIFICANT CHANGE UP (ref 0.4–2)
BUN SERPL-MCNC: 7 MG/DL — LOW (ref 8–20)
CALCIUM SERPL-MCNC: 8.2 MG/DL — LOW (ref 8.6–10.2)
CHLORIDE SERPL-SCNC: 105 MMOL/L — SIGNIFICANT CHANGE UP (ref 98–107)
CO2 SERPL-SCNC: 23 MMOL/L — SIGNIFICANT CHANGE UP (ref 22–29)
COVID-19 SPIKE DOMAIN AB INTERP: POSITIVE
COVID-19 SPIKE DOMAIN ANTIBODY RESULT: >250 U/ML — HIGH
CREAT SERPL-MCNC: 0.44 MG/DL — LOW (ref 0.5–1.3)
EGFR: 141 ML/MIN/1.73M2 — SIGNIFICANT CHANGE UP
EOSINOPHIL # BLD AUTO: 0.03 K/UL — SIGNIFICANT CHANGE UP (ref 0–0.5)
EOSINOPHIL NFR BLD AUTO: 0.2 % — SIGNIFICANT CHANGE UP (ref 0–6)
GLUCOSE SERPL-MCNC: 104 MG/DL — HIGH (ref 70–99)
HCT VFR BLD CALC: 33.8 % — LOW (ref 34.5–45)
HGB BLD-MCNC: 11 G/DL — LOW (ref 11.5–15.5)
IMM GRANULOCYTES NFR BLD AUTO: 0.6 % — SIGNIFICANT CHANGE UP (ref 0–1.5)
LYMPHOCYTES # BLD AUTO: 17.2 % — SIGNIFICANT CHANGE UP (ref 13–44)
LYMPHOCYTES # BLD AUTO: 2.17 K/UL — SIGNIFICANT CHANGE UP (ref 1–3.3)
MCHC RBC-ENTMCNC: 29.3 PG — SIGNIFICANT CHANGE UP (ref 27–34)
MCHC RBC-ENTMCNC: 32.5 GM/DL — SIGNIFICANT CHANGE UP (ref 32–36)
MCV RBC AUTO: 90.1 FL — SIGNIFICANT CHANGE UP (ref 80–100)
MEV IGG SER-ACNC: 200 AU/ML — SIGNIFICANT CHANGE UP
MEV IGG+IGM SER-IMP: POSITIVE — SIGNIFICANT CHANGE UP
MONOCYTES # BLD AUTO: 1.04 K/UL — HIGH (ref 0–0.9)
MONOCYTES NFR BLD AUTO: 8.2 % — SIGNIFICANT CHANGE UP (ref 2–14)
NEUTROPHILS # BLD AUTO: 9.31 K/UL — HIGH (ref 1.8–7.4)
NEUTROPHILS NFR BLD AUTO: 73.6 % — SIGNIFICANT CHANGE UP (ref 43–77)
PLATELET # BLD AUTO: 193 K/UL — SIGNIFICANT CHANGE UP (ref 150–400)
POTASSIUM SERPL-MCNC: 4 MMOL/L — SIGNIFICANT CHANGE UP (ref 3.5–5.3)
POTASSIUM SERPL-SCNC: 4 MMOL/L — SIGNIFICANT CHANGE UP (ref 3.5–5.3)
PROT SERPL-MCNC: 5.3 G/DL — LOW (ref 6.6–8.7)
RBC # BLD: 3.75 M/UL — LOW (ref 3.8–5.2)
RBC # FLD: 12.3 % — SIGNIFICANT CHANGE UP (ref 10.3–14.5)
SARS-COV-2 IGG+IGM SERPL QL IA: >250 U/ML — HIGH
SARS-COV-2 IGG+IGM SERPL QL IA: POSITIVE
SODIUM SERPL-SCNC: 138 MMOL/L — SIGNIFICANT CHANGE UP (ref 135–145)
T PALLIDUM AB TITR SER: NEGATIVE — SIGNIFICANT CHANGE UP
WBC # BLD: 12.65 K/UL — HIGH (ref 3.8–10.5)
WBC # FLD AUTO: 12.65 K/UL — HIGH (ref 3.8–10.5)

## 2022-04-26 RX ORDER — IBUPROFEN 200 MG
600 TABLET ORAL EVERY 6 HOURS
Refills: 0 | Status: DISCONTINUED | OUTPATIENT
Start: 2022-04-26 | End: 2022-04-27

## 2022-04-26 RX ADMIN — Medication 975 MILLIGRAM(S): at 08:49

## 2022-04-26 RX ADMIN — Medication 1000 MILLIUNIT(S)/MIN: at 02:36

## 2022-04-26 RX ADMIN — Medication 600 MILLIGRAM(S): at 05:56

## 2022-04-26 RX ADMIN — Medication 600 MILLIGRAM(S): at 17:39

## 2022-04-26 RX ADMIN — Medication 1 TABLET(S): at 12:24

## 2022-04-26 RX ADMIN — Medication 600 MILLIGRAM(S): at 23:28

## 2022-04-26 RX ADMIN — Medication 975 MILLIGRAM(S): at 01:43

## 2022-04-26 RX ADMIN — Medication 600 MILLIGRAM(S): at 12:24

## 2022-04-26 RX ADMIN — SODIUM CHLORIDE 3 MILLILITER(S): 9 INJECTION INTRAMUSCULAR; INTRAVENOUS; SUBCUTANEOUS at 06:07

## 2022-04-26 NOTE — DISCHARGE NOTE OB - PLAN OF CARE
Patient should transition to regular activity level. Resume regular diet. Patient should follow up with her OB for a postpartum checkup 4-6 weeks after delivery. Patient should call her doctor sooner if she develops a fever or uncontrolled vaginal bleeding. Please call sooner if there are any other concerns.

## 2022-04-26 NOTE — DISCHARGE NOTE OB - MATERIALS PROVIDED
New Beginnings/Vaccinations/[facility] Screening Program/  Immunization Record/Breastfeeding Log/Breastfeeding Mother’s Support Group Information/Guide to Postpartum Care/[facility] Hearing Screen Program/Back To Sleep Handout/Shaken Baby Prevention Handout/Breastfeeding Guide and Packet/Birth Certificate Instructions

## 2022-04-26 NOTE — DISCHARGE NOTE OB - CARE PLAN
1 Principal Discharge DX:	 (normal spontaneous vaginal delivery)  Assessment and plan of treatment:	Patient should transition to regular activity level. Resume regular diet. Patient should follow up with her OB for a postpartum checkup 4-6 weeks after delivery. Patient should call her doctor sooner if she develops a fever or uncontrolled vaginal bleeding. Please call sooner if there are any other concerns.

## 2022-04-26 NOTE — DISCHARGE NOTE OB - HOSPITAL COURSE
22 year old  PP#2 s/p  at 39wks gestation, immediate post partum course significant for maternal fever, now resolved. She had a normal postpartum course and was discharged home in stable condition on postpartum day 2.

## 2022-04-26 NOTE — DISCHARGE NOTE OB - CARE PROVIDER_API CALL
Shanti Coates)  Obstetrics and Gynecology  [address] [place] [address]  Phone: [phone]  Fax: [phone]  Follow Up Time:

## 2022-04-26 NOTE — DISCHARGE NOTE OB - MEDICATION SUMMARY - MEDICATIONS TO TAKE
I will START or STAY ON the medications listed below when I get home from the hospital:    ibuprofen 600 mg oral tablet  -- 1 tab(s) by mouth every 6 hours MDD:4  -- Do not take this drug if you are pregnant.  It is very important that you take or use this exactly as directed.  Do not skip doses or discontinue unless directed by your doctor.  May cause drowsiness or dizziness.  Obtain medical advice before taking any non-prescription drugs as some may affect the action of this medication.  Take with food or milk.    -- Indication: For PAIN

## 2022-04-26 NOTE — DISCHARGE NOTE OB - NS MD DC FALL RISK RISK
For information on Fall & Injury Prevention, visit: [email or web address] OR  [email or web address] OR  https://www.cdc.gov/steadi/patient.html

## 2022-04-26 NOTE — DISCHARGE NOTE OB - PATIENT PORTAL LINK FT
You can access the FollowMyHealth Patient Portal offered by [facility] by registering at the following website: [email or web address]. By joining [employer]’s FollowMyHealth portal, you will also be able to view your health information using other applications (apps) compatible with our system.

## 2022-04-26 NOTE — PROGRESS NOTE ADULT - ATTENDING COMMENTS
PPD1  LFT improving
22y year old  PPP#1 s/p   no complaints  breast feeding   abd soft, Uterus firm , NT  ext no edema  plan   routine Post partum care  DVT ppx

## 2022-04-27 VITALS
DIASTOLIC BLOOD PRESSURE: 72 MMHG | SYSTOLIC BLOOD PRESSURE: 116 MMHG | HEART RATE: 72 BPM | OXYGEN SATURATION: 97 % | RESPIRATION RATE: 18 BRPM | TEMPERATURE: 98 F

## 2022-04-27 RX ORDER — IBUPROFEN 200 MG
1 TABLET ORAL
Qty: 30 | Refills: 0
Start: 2022-04-27

## 2022-04-27 RX ADMIN — Medication 600 MILLIGRAM(S): at 06:04

## 2022-04-27 NOTE — PROGRESS NOTE ADULT - ASSESSMENT
22y year old  PPP#1 s/p  at 39wks gestation, immediate post partum course significant for maternal fever, now resolved.   - VSS, afebrile, no constitutional symptoms, not on standing antibiotics   - predelivery Hgb 12.5, repeat labs pending this AM, minimal lochia, denies symptoms of anemia   - CMP pending this AM, LFT's moderately elevated, otherwise asymptomatic   - voiding and ambulating without difficulty, encouraged continued ambulation   - tolerating regular diet, encouraged continued PO intake   - healthy female infant   - anticipated discharge tomorrow, PPD#2 
22y year old  PPD#2 s/p  at 39wks gestation, immediate post partum course significant for maternal fever, now resolved.   - VSS, afebrile, no constitutional symptoms, not on standing antibiotics   - predelivery Hgb 12.5>11  - minimal lochia, denies symptoms of anemia   - LFT's downtrending asymptomatic  - voiding and ambulating without difficulty, encouraged continued ambulation   - tolerating regular diet, encouraged continued PO intake   - healthy female infant   - anticipated discharge today pending attending approval

## 2022-04-27 NOTE — PROGRESS NOTE ADULT - SUBJECTIVE AND OBJECTIVE BOX
22y year old  PPD#2 s/p  at 39wks gestation, immediate post partum course significant for maternal fever, now resolved.     No acute overnight events. Pain well controlled on PRN pain regimen.   Patient is ambulating, +voiding, +flatus, -BM  Tolerating regular diet, denies nausea/vomiting.   Reports minimal lochia.   +breast feeding, -breast tenderness  Denies dizziness, lightheadedness, SOB, palpitations, or fatigue at rest or while ambulating.   Denies fevers, chills, malaise, fatigue, and myalgia.     VS:   Vital Signs Last 24 Hrs  T(C): 36.9 (2022 03:45), Max: 36.9 (2022 08:36)  T(F): 98.4 (2022 03:45), Max: 98.4 (2022 08:36)  HR: 72 (2022 03:45) (72 - 73)  BP: 116/72 (2022 03:45) (116/72 - 121/62)  BP(mean): --  RR: 18 (2022 03:45) (18 - 18)  SpO2: 97% (2022 03:45) (97% - 98%)    Physical Exam:  General: NAD  Cardio: RRR  Lungs: CTAB   Abdomen: soft, ND, firm fundus palpated at the umbilicus, appropriately tender   Ext: nontender lower extremities, bilaterally.    Labs:                        12.5   9.83  )-----------( 219      ( 2022 11:58 )             36.4   
S: Patient doing well. Minimal lochia. No complaints.     O: Vital Signs Last 24 Hrs  T(C): 36.9 (2022 03:45), Max: 36.9 (2022 08:36)  T(F): 98.4 (2022 03:45), Max: 98.4 (2022 08:36)  HR: 72 (2022 03:45) (72 - 73)  BP: 116/72 (2022 03:45) (116/72 - 121/62)  BP(mean): --  RR: 18 (2022 03:45) (18 - 18)  SpO2: 97% (2022 03:45) (97% - 98%)    Gen: NAD  Breast : breast feeding  Abd: soft, NT, ND, fundus firm below umbilicus  Ext: no tenderness    Labs:                        11.0   12.65 )-----------( 193      ( 2022 07:20 )             33.8            PP # 2 s/p     Doing well.  Discharge home today.  Nothing in vagina and no heavy lifting for 6 weeks.   Follow up 6 weeks for post partum visit.  Call office for any fevers, chills, heavy vaginal bleeding, symptoms of depression, or any other concerning symptoms.  Continue motrin 600 mg every 6 hours.              
JERMAINE ARENAS is a 22y  now PPD#1 s/p spontaneous vaginal delivery at 39w1d gestation, complicated by 1st degree perineal and periurethral laceration and maternal fever post-partum.     S:    Maternal fever postpartum to 100.6, since resolved and patient afebrile.   The patient has no complaints.  Pain controlled with current treatment regimen.   She is ambulating without difficulty and tolerating PO.   + flatus/-BM/+ voiding   She endorses appropriate lochia, which is decreasing.   She is breastfeeding without difficulty.   She denies fevers, chills, nausea and vomiting.   She denies lightheadedness, dizziness, palpitations, chest pain and SOB.     O:    T(C): 37.1 (22 @ 02:50), Max: 38.1 (22 @ 01:20)  HR: 82 (22 @ 02:50) (64 - 133)  BP: 124/67 (22 @ 01:20) (108/57 - 145/89)  RR: 16 (22 @ 01:20) (14 - 20)  SpO2: 97% (22 @ 01:20) (95% - 99%)    Gen: NAD, AOx3  CV: RRR, S1/S2 present  Pulm: CTAB  Breast: Nontender, non-engorged   Abdomen:  Soft, non-tender, non-distended, +bowel sounds  Uterus:  Fundus firm below umbilicus  VE:  Expectant lochia  Ext:  Non-tender and non-edematous                          12.5   9.83  )-----------( 219      ( 2022 11:58 )             36.4         138  |  105  |  6.6<L>  ----------------------------<  105<H>  4.0   |  20.0<L>  |  0.41<L>    Ca    9.2      2022 11:57    TPro  6.6  /  Alb  3.4  /  TBili  0.3<L>  /  DBili  x   /  AST  72<H>  /  ALT  179<H>  /  AlkPhos  195<H>        A/P:    -Vital signs stable  -Hgb: 12.5 -> AM CBC pending   -Trending LFTs: AST 72//AlkPhos 195 -> AM CMP pending  -Voiding, tolerating PO, bowel function nml   -Advance care as tolerated   -Continue routine postpartum care and education  -Healthy female infant  -Dispo: Patient to be discharged when meeting all postpartum and postoperative milestones and pending attending approval.  
22y year old  PPP#1 s/p  at 39wks gestation, immediate post partum course significant for maternal fever, now resolved.     No acute overnight events. Pain well controlled on PRN pain regimen.   Patient is ambulating, +voiding, +flatus, -BM  Tolerating regular diet, denies nausea/vomiting.   Reports minimal lochia.   +breast feeding, -breast tenderness  Denies dizziness, lightheadedness, SOB, palpitations, or fatigue at rest or while ambulating.   Denies fevers, chills, malaise, fatigue, and myalgia.     VS:   Vital Signs Last 24 Hrs  T(C): 37.1 (2022 02:50), Max: 38.1 (2022 01:20)  T(F): 98.8 (2022 02:50), Max: 100.6 (2022 01:20)  HR: 82 (2022 02:50) (64 - 133)  BP: 124/67 (2022 01:20) (108/57 - 145/89)  RR: 16 (2022 01:20) (14 - 20)  SpO2: 97% (2022 01:20) (95% - 99%)    Physical Exam:  General: NAD  Cardio: RRR  Lungs: CTAB   Abdomen: soft, ND, firm fundus palpated at the umbilicus, appropriately tender   Ext: nontender lower extremities, bilaterally.    Labs:                        12.5   9.83  )-----------( 219      ( 2022 11:58 )             36.4

## 2022-05-30 PROCEDURE — 85025 COMPLETE CBC W/AUTO DIFF WBC: CPT

## 2022-05-30 PROCEDURE — U0003: CPT

## 2022-05-30 PROCEDURE — 86901 BLOOD TYPING SEROLOGIC RH(D): CPT

## 2022-05-30 PROCEDURE — 86769 SARS-COV-2 COVID-19 ANTIBODY: CPT

## 2022-05-30 PROCEDURE — 86850 RBC ANTIBODY SCREEN: CPT

## 2022-05-30 PROCEDURE — 59025 FETAL NON-STRESS TEST: CPT

## 2022-05-30 PROCEDURE — 59050 FETAL MONITOR W/REPORT: CPT

## 2022-05-30 PROCEDURE — 88307 TISSUE EXAM BY PATHOLOGIST: CPT

## 2022-05-30 PROCEDURE — 36415 COLL VENOUS BLD VENIPUNCTURE: CPT

## 2022-05-30 PROCEDURE — 82962 GLUCOSE BLOOD TEST: CPT

## 2022-05-30 PROCEDURE — 86765 RUBEOLA ANTIBODY: CPT

## 2022-05-30 PROCEDURE — U0005: CPT

## 2022-05-30 PROCEDURE — 86780 TREPONEMA PALLIDUM: CPT

## 2022-05-30 PROCEDURE — G0463: CPT

## 2022-05-30 PROCEDURE — 80053 COMPREHEN METABOLIC PANEL: CPT

## 2022-05-30 PROCEDURE — 86900 BLOOD TYPING SEROLOGIC ABO: CPT

## 2023-03-10 NOTE — OB RN PATIENT PROFILE - FUNCTIONAL SCREEN CURRENT LEVEL: SWALLOWING (IF SCORE 2 OR MORE FOR ANY ITEM, CONSULT REHAB SERVICES), MLM)
PT/PTT/INR/Type and Screen/HCG/COVID-19 CBC/CMP/PT/PTT/INR/Type and Screen/Urinalysis/HCG/EKG/COVID-19 0 = swallows foods/liquids without difficulty

## 2024-02-19 NOTE — OB RN PATIENT PROFILE - BSA (M2)
Pre-Procedure Patient Identification:  I am the Primary Anesthesiologist and have identified the patient on 02/19/24 at 10:03 AM.   I have confirmed the procedure(s) will be performed by the following surgeon/proceduralist Yohana Harden MD.   1.99

## 2024-08-03 NOTE — OB RN PATIENT PROFILE - NS_EDDCALCULATED_OBGYN_ALL_OB
Admitted for septic workup and evaluation ,send blood and urine cx ,serial lactate levels ,monitor vitals closely hydration ,monitor urine output and renal profile, iv abx initiated Patient was ruled in for sepsis 2/2 to acute uti 2/2 to chronic indwelling Wright catheter , poa - repeat blood cx , ID cons informed , patient is on ceftriaxone Admitted for septic workup and evaluation ,send blood and urine cx ,serial lactate levels ,monitor vitals closely hydration ,monitor urine output and renal profile, iv abx initiated Patient was ruled in for sepsis 2/2 to acute uti 2/2 to chronic indwelling Wright catheter , poa - repeat blood cx , ID cons informed , patient is on ceftriaxone Third Trimester Sonogram